# Patient Record
Sex: MALE | Race: WHITE | NOT HISPANIC OR LATINO | Employment: UNEMPLOYED | URBAN - METROPOLITAN AREA
[De-identification: names, ages, dates, MRNs, and addresses within clinical notes are randomized per-mention and may not be internally consistent; named-entity substitution may affect disease eponyms.]

---
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
## 2023-01-28 NOTE — PROGRESS NOTES
Daily Note     Today's date: 2020  Patient name: Jamil Draper  : 2005  MRN: [id number]  Referring provider: Karime Luke PA-C  Dx:   Encounter Diagnosis     ICD-10-CM    1  Sprain of anterior cruciate ligament of left knee, subsequent encounter  S83 512D        Start Time:   Stop Time: 1550  Total time in clinic (min): 65 minutes  1 on 1 with PT from 250-330, not billed remainder    Subjective: Patient reports soreness in his quad following STM last visit  States he was sore for 2 days post treatment  Patient offers no new complaints at this time  Objective: See treatment diary below      Assessment: Patient continues with moderate restrictions in distal quad  Patient very fatigued with progression of neuromuscular control of quads and gluts  Progressed as charted  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  Plan: Continue per plan of care  Continue to progress per protocol  Precautions: ACLR on 20, quad tendon graft      Manuals    PROM MP EB MP    ISTM quad with flexion stretch over edge of table MP EB MP EB   Manual quad/hamstring stretch MP EB MP quad only    ISTM hamstrings       MFD   MD EB   Neuro Re-Ed       minisquat on bosu    10 x   bridging       SL TrX squat 3 x 10 3 x 10  3 x 10 3 x 10    Split squat 10x 2 x 10 8# 2 x 10 8# 3 x 10 8#   Mini squat 3 x 10 8# 3 x 10 10# KB 3 x 10 10# KB 3 x 10 25# KB   Step up reverse lunge    3 x 10, 12"   Step up 12" 8# DB 3 x 10 18" 8# 3 x 10  18"  8# 3 x 10    Lateral step up       SL hip abd isometric at wall 5 x 20" 5 x 20" 5" x 10    Wobble board  Squat hold 10 x 5" hold AP/ ML Squat hold 10 x 5" hold AP/ML    Leg press 50# 3 x 10 90# 3 x 10   90# 3 x 10    Rebounder ball toss 2 x 20 YMB green foam 30 YMB blue foam 2 x 20 YMB blue foam 20 x ea YMB rockerbaord AP/ ML   Sport cord walk       storks 4 way plum x10 b/l ea  Green foam 4 way plum x10 b/l ea   Green foam 4 way plum x 10 b/l ea   Blue foam 4 way plum x 10 b/l black foam no shoes   TKE with vini 15# 30 x 5" hold 20# 30x 5"            Ther Ex       bike 10' L3 10' L4 10'L4 10' L4   Heel slide       TKE stretch 5'  9# above and below D/c     Calf stretch       Hamstring stretch       Quad stretch  4 x 30" 4 x 30" 4 x 30"   HR/TR       LAQ/HSC  3 x 10 10# LAQ 3 x 10 10#  3 x 10 10# ea          Ther Activity                     Gait Training       Heel toe walking over yellow hurdles       Backwards treadmill walk       Modalities       gameready 20' Principal Discharge DX:	Pharyngitis   1

## 2025-01-06 ENCOUNTER — EVALUATION (OUTPATIENT)
Dept: PHYSICAL THERAPY | Facility: OTHER | Age: 20
End: 2025-01-06
Payer: COMMERCIAL

## 2025-01-06 DIAGNOSIS — Z98.890 S/P ACL RECONSTRUCTION: Primary | ICD-10-CM

## 2025-01-06 PROCEDURE — 97161 PT EVAL LOW COMPLEX 20 MIN: CPT

## 2025-01-06 PROCEDURE — 97112 NEUROMUSCULAR REEDUCATION: CPT

## 2025-01-06 NOTE — LETTER
2025    Cb Rothman MD  [address] [place] [id number]    Patient: Walter Pierce   YOB: 2005   Date of Visit: 2025     Encounter Diagnosis     ICD-10-CM    1. S/P ACL reconstruction  Z98.890           Dear Dr. Rothman:    Thank you for your recent referral of Walter Pierce. Please review the attached evaluation summary from Walter's recent visit.     Please verify that you agree with the plan of care by signing the attached order.     If you have any questions or concerns, please do not hesitate to call.     I sincerely appreciate the opportunity to share in the care of one of your patients and hope to have another opportunity to work with you in the near future.       Sincerely,    Kelly Angelucci, PT      Referring Provider:      I certify that I have read the below Plan of Care and certify the need for these services furnished under this plan of treatment while under my care.                    Cb Rothman MD  [address] [place] [id number]  Via Fax: [phone]          PT Evaluation     Today's date: 2025  Patient name: Walter Pierce  : 2005  MRN: [id number]  Referring provider: No ref. provider found  Dx:   Encounter Diagnosis     ICD-10-CM    1. S/P ACL reconstruction  Z98.890                      Assessment  Impairments: abnormal coordination, abnormal muscle firing, abnormal or restricted ROM, abnormal movement, impaired balance, impaired physical strength, lacks appropriate home exercise program, pain with function, weight-bearing intolerance, poor posture  and unable to perform ADL  Symptom irritability: moderate    Assessment details: Walter Pierce is a pleasant 19 y.o. male who presents s/p L ACRL.  Patient's greatest concerns are fear of not being able to stay active. Pt underwent L ACLR with allograft on 2025. This is his second L ACLR. He states his pain is manageable at this time, and takes extra strength Tylenol,  "Motrin, and Oxy as prescribed. Last night he was able to sleep through the night without waking due to pain. He has been sleeping and amb with B axillary crutches with extension brace locked. Pt has a follow-up with surgeon next week. 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 He has been staying with his parents over break. He occasionally gets numbness and tingling in his ankle but is not constant. He ices \"every now and then\" when his knee feels swollen. He has been elevating it \"as much as I can\" but it sometimes hurts his knee more when elevated. Pt goals are to be able to play soccer for fun at the end of his rehab.     The primary movement problem is decreased quad activation and knee hypomobility secondary to post-operative restrictions resulting in pain and limiting his ability to amb, sleep, perform ADLs, perform school-related activities, and perform recreational activities. No further referral is necessary at this time based upon examination results. Pt would benefit from skilled physical therapy services to address current deficits, improve quality of life, and restore PLOF with transition to home exercise program when appropriate. Positive prognostic indicators include positive attitude towards recovery, age, and previous successful rehab for ACLR. Please contact me if you have any questions or recommendations. Thank you for the opportunity to share in [facility].      Primary Impairments:  1) decreased L quad contraction  2) decreased L knee ROM  3) decreased WB tolerance   4) decreased gait quality    Impairment Based Goals:  Patient will increase L knee ROM to 0-110 in 2 weeks.  Patient will increase L quad contraction to good in 2 weeks.   Patient will demonstrate good gait quality WBAT with B axillary crutches and brace unlocked in 2 weeks.     Understanding of Dx/Px/POC: good     Prognosis: " good    Plan  Patient would benefit from: skilled physical therapy  Referral necessary: No  Planned modality interventions: cryotherapy, electrical stimulation/Russian stimulation, thermotherapy: hydrocollator packs, TENS, low level laser therapy and traction    Planned therapy interventions: abdominal trunk stabilization, activity modification, balance, body mechanics training, breathing training, coordination, flexibility, functional ROM exercises, home exercise program, therapeutic exercise, therapeutic activities, stretching, strengthening, postural training, patient education, neuromuscular re-education, motor coordination training, massage, manual therapy, joint mobilization, IASTM, kinesiology taping, Barros taping, nerve gliding and muscle pump exercises    Frequency: 2x week  Duration in weeks: 12  Treatment plan discussed with: patient  Plan details: 2x/wk for 2 weeks until patient returns to school with HEP    Subjective Evaluation    History of Present Illness  Date of surgery: 2025  Mechanism of injury: surgery          Not a recurrent problem   Quality of life: good    Patient Goals  Patient goals for therapy: increased strength, independence with ADLs/IADLs, return to sport/leisure activities, decreased pain, improved balance and increased motion    Pain  Current pain ratin  At best pain ratin  At worst pain ratin  Location: L medial and lateral knee  Quality: dull ache (sore)  Relieving factors: relaxation, rest and medications  Aggravating factors: standing, walking, sitting, stair climbing and running  Progression: improved    Social Support  Lives with: parents for now, back to apt at school with 2 roommates.    Employment status: not working (full time student at [employer])    Diagnostic Tests  No diagnostic tests performed  Treatments  No previous or current treatments      Objective     General Comments:      Knee Comments  Observation: swelling noted, no other  "discoloration, sutures healed well, no signs of infection    Knee ROM: 0-90 L    Quad contraction: L fair    Patellar mobility: L slightly levine mobility    Gait: NWB B axillary crutches and extension brace locked , educated on 50% WB with crutches and brace locked in extension           Precautions: s/p L ACLR 1/2/2025 SEE PROTOCOL, 50% WB with crutches  [id number]    POC expires Unit limit Auth Expiration date PT/OT + Visit Limit?   3/31/25 BOMN  60 PPY                           Visit/Unit Tracking  AUTH Status:  Date 1/6               Used 1               Remaining                        Manuals 1 - 1/6  FOTO: 51            L knee PROM nv            L patellar mobilizations KA grII-III all directions                                      Neuro Re-Ed             Quad sets 5\"x20            SLR  Flex 3x3 AAROM with brace locked     Add 4 way nv            Ankle pumps 20x            Heel slides 10\"x10            Ham iso nv            Adductor iso nv            Weight shift nv            Calf raise  nv            Ther Ex             Calf stretch 10\"x10            Hamstring stretch nv            Prone hang nv            LLLD nv            Bike  nv                                                   Ther Activity                                       Gait Training             Stair negotiation Nv with crutches and brace                         Modalities                                                             "

## 2025-01-06 NOTE — PROGRESS NOTES
"PT Evaluation     Today's date: 2025  Patient name: Walter Pierce  : 2005  MRN: [id number]  Referring provider: No ref. provider found  Dx:   Encounter Diagnosis     ICD-10-CM    1. S/P ACL reconstruction  Z98.890                      Assessment  Impairments: abnormal coordination, abnormal muscle firing, abnormal or restricted ROM, abnormal movement, impaired balance, impaired physical strength, lacks appropriate home exercise program, pain with function, weight-bearing intolerance, poor posture  and unable to perform ADL  Symptom irritability: moderate    Assessment details: Walter Pierce is a pleasant 19 y.o. male who presents s/p L ACRL.  Patient's greatest concerns are fear of not being able to stay active. Pt underwent L ACLR with allograft on 2025. This is his second L ACLR. He states his pain is manageable at this time, and takes extra strength Tylenol, Motrin, and Oxy as prescribed. Last night he was able to sleep through the night without waking due to pain. He has been sleeping and amb with B axillary crutches with extension brace locked. Pt has a follow-up with surgeon next week. 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 He has been staying with his parents over break. He occasionally gets numbness and tingling in his ankle but is not constant. He ices \"every now and then\" when his knee feels swollen. He has been elevating it \"as much as I can\" but it sometimes hurts his knee more when elevated. Pt goals are to be able to play soccer for fun at the end of his rehab.     The primary movement problem is decreased quad activation and knee hypomobility secondary to post-operative restrictions resulting in pain and limiting his ability to amb, sleep, perform ADLs, perform school-related activities, and perform recreational activities. No further referral is necessary at this time based upon examination " results. Pt would benefit from skilled physical therapy services to address current deficits, improve quality of life, and restore PLOF with transition to home exercise program when appropriate. Positive prognostic indicators include positive attitude towards recovery, age, and previous successful rehab for ACLR. Please contact me if you have any questions or recommendations. Thank you for the opportunity to share in [facility].      Primary Impairments:  1) decreased L quad contraction  2) decreased L knee ROM  3) decreased WB tolerance   4) decreased gait quality    Impairment Based Goals:  Patient will increase L knee ROM to 0-110 in 2 weeks.  Patient will increase L quad contraction to good in 2 weeks.   Patient will demonstrate good gait quality WBAT with B axillary crutches and brace unlocked in 2 weeks.     Understanding of Dx/Px/POC: good     Prognosis: good    Plan  Patient would benefit from: skilled physical therapy  Referral necessary: No  Planned modality interventions: cryotherapy, electrical stimulation/Russian stimulation, thermotherapy: hydrocollator packs, TENS, low level laser therapy and traction    Planned therapy interventions: abdominal trunk stabilization, activity modification, balance, body mechanics training, breathing training, coordination, flexibility, functional ROM exercises, home exercise program, therapeutic exercise, therapeutic activities, stretching, strengthening, postural training, patient education, neuromuscular re-education, motor coordination training, massage, manual therapy, joint mobilization, IASTM, kinesiology taping, Barros taping, nerve gliding and muscle pump exercises    Frequency: 2x week  Duration in weeks: 12  Treatment plan discussed with: patient  Plan details: 2x/wk for 2 weeks until patient returns to school with HEP    Subjective Evaluation    History of Present Illness  Date of surgery: 1/2/2025  Mechanism of injury: surgery          Not a  "recurrent problem   Quality of life: good    Patient Goals  Patient goals for therapy: increased strength, independence with ADLs/IADLs, return to sport/leisure activities, decreased pain, improved balance and increased motion    Pain  Current pain ratin  At best pain ratin  At worst pain ratin  Location: L medial and lateral knee  Quality: dull ache (sore)  Relieving factors: relaxation, rest and medications  Aggravating factors: standing, walking, sitting, stair climbing and running  Progression: improved    Social Support  Lives with: parents for now, back to apt at school with 2 roommates.    Employment status: not working (full time student at [employer])    Diagnostic Tests  No diagnostic tests performed  Treatments  No previous or current treatments      Objective     General Comments:      Knee Comments  Observation: swelling noted, no other discoloration, sutures healed well, no signs of infection    Knee ROM: 0-90 L    Quad contraction: L fair    Patellar mobility: L slightly levine mobility    Gait: NWB B axillary crutches and extension brace locked , educated on 50% WB with crutches and brace locked in extension           Precautions: s/p L ACLR 2025 SEE PROTOCOL, 50% WB with crutches  [id number]    POC expires Unit limit Auth Expiration date PT/OT + Visit Limit?   3/31/25 BOMN  60 PPY                           Visit/Unit Tracking  AUTH Status:  Date                Used 1               Remaining                        Manuals  -   FOTO: 51            L knee PROM nv            L patellar mobilizations KA grII-III all directions                                      Neuro Re-Ed             Quad sets 5\"x20            SLR  Flex 3x3 AAROM with brace locked     Add 4 way nv            Ankle pumps 20x            Heel slides 10\"x10            Ham iso nv            Adductor iso nv            Weight shift nv            Calf raise  nv            Ther Ex             Calf stretch 10\"x10          "   Hamstring stretch nv            Prone hang nv            LLLD nv            Bike  nv                                                   Ther Activity                                       Gait Training             Stair negotiation Nv with crutches and brace                         Modalities

## 2025-01-08 ENCOUNTER — OFFICE VISIT (OUTPATIENT)
Dept: PHYSICAL THERAPY | Facility: OTHER | Age: 20
End: 2025-01-08
Payer: COMMERCIAL

## 2025-01-08 DIAGNOSIS — Z98.890 S/P ACL RECONSTRUCTION: Primary | ICD-10-CM

## 2025-01-08 PROCEDURE — 97140 MANUAL THERAPY 1/> REGIONS: CPT

## 2025-01-08 PROCEDURE — 97110 THERAPEUTIC EXERCISES: CPT

## 2025-01-08 PROCEDURE — 97112 NEUROMUSCULAR REEDUCATION: CPT

## 2025-01-08 NOTE — PROGRESS NOTES
"Daily Note     Today's date: 2025  Patient name: Walter Pierce  : 2005  MRN: [id number]  Referring provider: No ref. provider found  Dx:   Encounter Diagnosis     ICD-10-CM    1. S/P ACL reconstruction  Z98.890                    Total treatment time 9340-1366, 1-on- 9818-0047  Subjective: \"I did my exercises but the leg lifts are still really hard.\"      Objective: See treatment diary below      Assessment: Measured 112 knee flexion today, will add in PROM knee ext nv. Pt reported pain and \"tightness\" in knee during quad sets, alleviated with STM to popliteus and IASTM, educated to perform popliteus STM at home as needed. Educated on stair negotiation today with and without crutches, patient expressed understanding. Will progress nv per protocol as tolerated.       Plan: Continue per plan of care.      Precautions: s/p L ACLR 2025 SEE PROTOCOL, 50% WB with crutches  [id number]    POC expires Unit limit Auth Expiration date PT/OT + Visit Limit?   3/31/25 BOMN 25 60 PPY                           Visit/Unit Tracking  AUTH Status:  Date 25 - 25 - 11 visits Used 1 2              Remaining  10 9                     Manuals  -   FOTO: 51 2 -            L knee PROM nv nv           L patellar mobilizations KA grII-III all directions nv           L popliteus STM  KA           L knee IASTM  KA quad and joint line           Neuro Re-Ed               Quad sets 5\"x20 5\"x20           SLR  Flex 3x3 AAROM with brace locked     Add 4 way nv nv           Ankle pumps 20x Np HEP           Heel slides 10\"x10 10\"x15           Ham iso nv 5\"x20           Adductor iso nv 5\"x20           Weight shift nv nv           Calf raise  nv nv           Ther Ex             Calf stretch 10\"x10 30\"x4           Hamstring stretch nv            Prone hang nv            LLLD nv            Bike  nv 10' rocking for ROM                                                  Ther Activity                               " "        Gait Training             Stair negotiation Nv with crutches and brace 4\" with 1 HR with and without crutches                        Modalities                                            "

## 2025-01-13 ENCOUNTER — OFFICE VISIT (OUTPATIENT)
Dept: PHYSICAL THERAPY | Facility: OTHER | Age: 20
End: 2025-01-13
Payer: COMMERCIAL

## 2025-01-13 DIAGNOSIS — Z98.890 S/P ACL RECONSTRUCTION: Primary | ICD-10-CM

## 2025-01-13 PROCEDURE — 97110 THERAPEUTIC EXERCISES: CPT | Performed by: PEDIATRICS

## 2025-01-13 PROCEDURE — 97112 NEUROMUSCULAR REEDUCATION: CPT | Performed by: PEDIATRICS

## 2025-01-13 PROCEDURE — 97140 MANUAL THERAPY 1/> REGIONS: CPT | Performed by: PEDIATRICS

## 2025-01-13 NOTE — PROGRESS NOTES
"Daily Note     Today's date: 2025  Patient name: Walter Pierce  : 2005  MRN: [id number]  Referring provider: No ref. provider found  Dx:   Encounter Diagnosis     ICD-10-CM    1. S/P ACL reconstruction  Z98.890                    Total treatment time 2642-2826, 1-on- 9545-0561  Subjective: \"I still have a bend in my knee when I try to do my straight leg raises. I am allowed to walk with my brace unlocked to 90* now.\"      Objective: See treatment diary below      Assessment: Measured 112 knee flexion today. Patient continues to demonstrate quad lag. He may benefit from NMES to encourage quad activation during future sessions. Demonstrated moderate to significant discomfort with addition of prone hangs. Performed for short duration. Recommended patient add heel propped knee extension or prone hangs for home. This will be patient's last week at this clinic as he will be returning to college next week.       Plan: Continue per plan of care.      Precautions: s/p L ACLR 2025 SEE PROTOCOL, 50% WB with crutches  [id number]    POC expires Unit limit Auth Expiration date PT/OT + Visit Limit?   3/31/25 BOMN 25 60 PPY                           Visit/Unit Tracking  AUTH Status:  Date 25 - 25 - 11 visits Used 1 2 3             Remaining  10 9 8                    Manuals  -   FOTO: 51 2 -  3-           L knee PROM nv nv EW          L patellar mobilizations KA grII-III all directions nv np          L popliteus STM  KA np          L knee IASTM  KA quad and joint line EW quad and joint line          Neuro Re-Ed               Quad sets 5\"x20 5\"x20           SLR  Flex 3x3 AAROM with brace locked     Add 4 way nv nv 2x10  Flex, add, abd          Ankle pumps 20x Np HEP           Heel slides 10\"x10 10\"x15 10\"x10          Ham iso nv 5\"x20 5\"x20          Adductor iso nv 5\"x20           Weight shift nv nv 10\"x10          Calf raise  nv nv 20x          Toe raises   20x        " "  Ther Ex             Calf stretch 10\"x10 30\"x4 30\"x4          Hamstring stretch nv            Prone hang nv  2'          LLLD nv            Bike  nv 10' rocking for ROM 10' rocking for ROM                                                 Ther Activity                                       Gait Training             Stair negotiation Nv with crutches and brace 4\" with 1 HR with and without crutches                        Modalities                                            "

## 2025-01-15 ENCOUNTER — OFFICE VISIT (OUTPATIENT)
Dept: PHYSICAL THERAPY | Facility: OTHER | Age: 20
End: 2025-01-15
Payer: COMMERCIAL

## 2025-01-15 DIAGNOSIS — Z98.890 S/P ACL RECONSTRUCTION: Primary | ICD-10-CM

## 2025-01-15 PROCEDURE — 97112 NEUROMUSCULAR REEDUCATION: CPT | Performed by: PEDIATRICS

## 2025-01-15 PROCEDURE — 97140 MANUAL THERAPY 1/> REGIONS: CPT | Performed by: PEDIATRICS

## 2025-01-15 NOTE — PROGRESS NOTES
"Daily Note     Today's date: 1/15/2025  Patient name: Walter Pierce  : 2005  MRN: [id number]  Referring provider: No ref. provider found  Dx:   Encounter Diagnosis     ICD-10-CM    1. S/P ACL reconstruction  Z98.890                    Total treatment time 4967-5259  1:1 0928-9275  Subjective: \"I am having a lot of discomfort on the outside of my knee. I feel like it may be from the prone hangs we did last session.\"      Objective: See treatment diary below      Assessment: Patient demonstrating increased sensitivity to palpation of lateral knee. Noted increased pain with ROM today as well. Believe this may be aggravation from prone hangs performed last treatment session. Discussed with patient not pushing through pain or discomfort the next few days to allow inflammation to calm down. Patient continues to demonstrate quad atrophy, quad strength 3-, and lacking neuromuscular control. Was able to successfully complete NMES for quad with improved quad activation noted. Would benefit from home NMES unit. Patient will be doing PT with AT at his college beginning next week. Provided patient with PT information if AT or patient has any questions and needs to reach out in the future.       Plan: Continue per plan of care.      Precautions: s/p L ACLR 2025 SEE PROTOCOL, 50% WB with crutches  [id number]    POC expires Unit limit Auth Expiration date PT/OT + Visit Limit?   3/31/25 BOMN 25 60 PPY                           Visit/Unit Tracking  AUTH Status:  Date 1/6 1/8 1/13 1/15           1/6/25 - 25 - 11 visits Used 1 2 3 4            Remaining  10 9 8 7                   Manuals  -   FOTO: 51 2 -  3-  4-1/15         L knee PROM nv nv EW EW         L patellar mobilizations KA grII-III all directions nv np KA         L popliteus STM  KA np np         L knee IASTM  KA quad and joint line EW quad and joint line np         Neuro Re-Ed               Quad sets 5\"x20 5\"x20  5\"x20         NEMS    10' quad set  " "       SLR  Flex 3x3 AAROM with brace locked     Add 4 way nv nv 2x10  Flex, add, abd np         Ankle pumps 20x Np HEP           Heel slides 10\"x10 10\"x15 10\"x10 10\"x10         Ham iso nv 5\"x20 5\"x20 5\"x15         Adductor iso nv 5\"x20           Weight shift nv nv 10\"x10 np         Calf raise  nv nv 20x np         Toe raises   20x np         Ther Ex             Calf stretch 10\"x10 30\"x4 30\"x4 30\"x4         Hamstring stretch nv            Prone hang nv  2' np         LLLD nv            Bike  nv 10' rocking for ROM 10' rocking for ROM 10' rocking for ROM                                                Ther Activity                                       Gait Training             Stair negotiation Nv with crutches and brace 4\" with 1 HR with and without crutches                        Modalities             Game ready    10'  med                           "